# Patient Record
Sex: MALE | Race: WHITE | Employment: OTHER | URBAN - METROPOLITAN AREA
[De-identification: names, ages, dates, MRNs, and addresses within clinical notes are randomized per-mention and may not be internally consistent; named-entity substitution may affect disease eponyms.]

---

## 2022-05-07 ENCOUNTER — HOSPITAL ENCOUNTER (OUTPATIENT)
Age: 42
Discharge: HOME | End: 2022-05-07
Payer: COMMERCIAL

## 2022-05-07 VITALS
TEMPERATURE: 98 F | WEIGHT: 260 LBS | HEART RATE: 86 BPM | OXYGEN SATURATION: 100 % | SYSTOLIC BLOOD PRESSURE: 113 MMHG | BODY MASS INDEX: 35.21 KG/M2 | HEIGHT: 72 IN | RESPIRATION RATE: 18 BRPM | DIASTOLIC BLOOD PRESSURE: 84 MMHG

## 2022-05-07 DIAGNOSIS — Z20.822 ENCOUNTER FOR LABORATORY TESTING FOR COVID-19 VIRUS: ICD-10-CM

## 2022-05-07 DIAGNOSIS — U07.1 COVID-19 VIRUS INFECTION: Primary | ICD-10-CM

## 2022-05-07 LAB — SARS-COV-2 RNA RESP QL NAA+PROBE: DETECTED

## 2022-05-07 NOTE — ED INITIAL ASSESSMENT (HPI)
Pt came in due to headache, congestion, and exposed to covid in the past 2 days. Pt has easy non labored respirations.

## 2024-04-09 ENCOUNTER — HOSPITAL ENCOUNTER (OUTPATIENT)
Age: 44
Discharge: HOME | End: 2024-04-09
Payer: COMMERCIAL

## 2024-04-09 VITALS
OXYGEN SATURATION: 98 % | HEART RATE: 87 BPM | TEMPERATURE: 98 F | RESPIRATION RATE: 18 BRPM | SYSTOLIC BLOOD PRESSURE: 137 MMHG | DIASTOLIC BLOOD PRESSURE: 85 MMHG

## 2024-04-09 DIAGNOSIS — S61.208A AVULSION OF SKIN OF MIDDLE FINGER, INITIAL ENCOUNTER: Primary | ICD-10-CM

## 2024-04-09 PROCEDURE — 99213 OFFICE O/P EST LOW 20 MIN: CPT | Performed by: NURSE PRACTITIONER

## 2024-04-09 RX ORDER — LOSARTAN POTASSIUM 50 MG/1
50 TABLET ORAL DAILY
COMMUNITY
End: 2024-04-15

## 2024-04-09 RX ORDER — PANTOPRAZOLE SODIUM 40 MG/1
40 TABLET, DELAYED RELEASE ORAL DAILY
COMMUNITY
Start: 2024-03-07

## 2024-04-09 RX ORDER — BUPROPION HYDROCHLORIDE 300 MG/1
300 TABLET ORAL DAILY
COMMUNITY

## 2024-04-09 RX ORDER — ROSUVASTATIN CALCIUM 20 MG/1
20 TABLET, COATED ORAL DAILY
COMMUNITY
Start: 2024-01-18

## 2024-04-09 RX ORDER — ATORVASTATIN CALCIUM 20 MG/1
20 TABLET, FILM COATED ORAL DAILY
COMMUNITY
Start: 2023-12-02 | End: 2024-04-15

## 2024-04-09 RX ORDER — ESCITALOPRAM OXALATE 10 MG/1
10 TABLET ORAL DAILY
COMMUNITY

## 2024-04-09 NOTE — ED PROVIDER NOTES
Patient Seen in: [facility]      History   No chief complaint on file.    Stated Complaint: left hand laceration    Subjective:   Well-appearing 43-year-old male with Chiari malformation, anxiety, attention deficit hyperactivity disorder, esophagitis, gastroesophageal reflux hyperlipidemia and essential hypertension presents with a skin avulsion to the tip of his left third digit that occurred about 30 minutes prior to arrival while using a 's knife at home.  Patient denies other injuries.  Patient communicates that his last tetanus was in 2016.  Patient denies extremity numbness or weakness, loss of sensation, decreased range of motion.              Objective:   History reviewed. No pertinent past medical history.           History reviewed. No pertinent surgical history.             No pertinent social history.            Review of Systems    Positive for stated complaint: left hand laceration  Other systems are as noted in HPI.  Constitutional and vital signs reviewed.      All other systems reviewed and negative except as noted above.    Physical Exam     ED Triage Vitals [04/09/24 1734]   /85   Pulse 87   Resp 18   Temp 97.8 °F (36.6 °C)   Temp src Temporal   SpO2 98 %   O2 Device None (Room air)       Current:/85   Pulse 87   Temp 97.8 °F (36.6 °C) (Temporal)   Resp 18   SpO2 98%         Physical Exam  VS: Vital signs reviewed. 02 saturation within normal limits for this patient.    General: Patient is awake and alert, oriented to person, place and time. Pt appears non-toxic.     HEENT: Head is normocephalic, atraumatic.  Nonicteric sclera, no conjunctival injection. No facial droop or slurred speech. No oral lesions or pallor. Mucous membranes moist.     Neck: Supple. Normal ROM.    Lungs: Good inspiratory effort.  No accessory muscle use or tachypnea.    Abdomen: Soft, nontender, non-distended.    Back: Normal inspection, no tenderness.     Extremities: Capillary refill  noted.      Left wrist: Normal.      Left hand: Skin avulsion present to distal left third digit, palmar aspect. Tenderness present. No swelling, deformity or bony tenderness. Normal range of motion. Normal strength. Normal sensation. Normal capillary refill. Normal pulse.     Skin: Warm, dry and normal in color.     Psychiatric: Normal affect, judgement normal, insight normal.     CNS: Moves all 4 extremities. Interacts appropriately. No gait abnormality. Memory normal.        ED Course   Labs Reviewed - No data to display    MDM   Medical Decision Making  Well-appearing.  Patient confirmed that his last tetanus was in April 2018.  Surgicel and a tube gauze dressing was applied in clinic.  Bleeding controlled.  I discussed over-the-counter acetaminophen and/or ibuprofen as needed for pain or discomfort.  Wound care was discussed with patient.  Information for follow-up was provided on discharge papers.  Return precautions discussed.    Differential diagnosis considered included laceration versus skin avulsion versus fingertip amputation.    Problems Addressed:  Avulsion of skin of middle finger, initial encounter: acute illness or injury    Risk  OTC drugs.        Disposition and Plan     Clinical Impression:  1. Avulsion of skin of middle finger, initial encounter         Disposition:  Discharge  4/9/2024  5:55 pm    Follow-up:  E[facility] Kat [facility] [address]  [phone]    In 1 week            Medications Prescribed:  Discharge Medication List as of 4/9/2024  5:56 PM

## 2024-04-15 ENCOUNTER — OFFICE VISIT (OUTPATIENT)
Dept: SURGERY | Facility: CLINIC | Age: 44
End: 2024-04-15

## 2024-04-15 DIAGNOSIS — S61.203A UNSPECIFIED OPEN WOUND OF LEFT MIDDLE FINGER WITHOUT DAMAGE TO NAIL, INITIAL ENCOUNTER: Primary | ICD-10-CM

## 2024-04-15 PROCEDURE — 99204 OFFICE O/P NEW MOD 45 MIN: CPT | Performed by: PLASTIC SURGERY

## 2024-04-15 RX ORDER — TADALAFIL 5 MG/1
5 TABLET ORAL
COMMUNITY
Start: 2018-06-01

## 2024-04-15 RX ORDER — LOSARTAN POTASSIUM 25 MG/1
TABLET ORAL
COMMUNITY
Start: 2024-01-03

## 2024-04-15 RX ORDER — ALBUTEROL SULFATE 90 UG/1
AEROSOL, METERED RESPIRATORY (INHALATION)
COMMUNITY
Start: 2023-11-08

## 2024-04-15 RX ORDER — DEXTROAMPHETAMINE SACCHARATE, AMPHETAMINE ASPARTATE, DEXTROAMPHETAMINE SULFATE AND AMPHETAMINE SULFATE 5; 5; 5; 5 MG/1; MG/1; MG/1; MG/1
20 TABLET ORAL 2 TIMES DAILY
COMMUNITY
Start: 2023-12-12

## 2024-04-15 RX ORDER — ROPINIROLE 0.5 MG/1
0.5 TABLET, FILM COATED ORAL DAILY
COMMUNITY
Start: 2024-03-01

## 2024-04-15 RX ORDER — ROPINIROLE 0.25 MG/1
0.25 TABLET, FILM COATED ORAL NIGHTLY
COMMUNITY
Start: 2024-02-07

## 2024-04-15 NOTE — H&P
Injury 1: LMF laceration  - Date: 04/09/24  - Days Since: 6    E Rip Murphy is a 43 year old male that presents with   Chief Complaint   Patient presents with    Laceration     LMF laceration   .    REFERRED BY:  Kandace Brower    Pacemaker: No  Latex Allergy: no  Coumadin: No  Plavix: No  Other anticoagulants: No  Diet medication: No  Cardiac stents: No    HAND DOMINANCE:  Right    Profession: Realtor    RECONSTRUCTIVE HISTORY    SUN EXPOSURE   Current no   Past no   Sunburns yes   Tanning salons current no   Tanning salons past no     SKIN CANCER    Personal history of skin cancer: none      HPI:       Injury 1: LMF TIP AVULSION  - Date: 04/09/24  - Days Since: 6      43-year-old male right-hand-dominant with an LMF tip amputation    Chopping cilantro    Immediate care, cleansed, Surgicel    Surgicel fell off and he has been washing and Band-Aid            Review of Systems:   Constitutional: No change in appetite, chill/rigors, or fatigue  GI: No jaundice  Endocrine: No generalized weakness  Neurological: No aphasia, loss of consciousness, or seizures    Musculoskeletal:      Date of injury 4/9/24     Location left      middle finger      Mechanism Cut finger while chopping cilantro     Treatment Seen in immediate care on 4/9/24, wound cleaned, Surgicel, gauze bandage, no antibiotics prescribed    Local care: washing daily, new bandaid     Pain  no     Numbness Yes slight at LMF DP          PMH:     MEDICAL  No past medical history on file.     SURGICAL  Past Surgical History:   Procedure Laterality Date    Hernia repair      Removal adenoids,second,<11 y/o          ALLERIGIES  Allergies   Allergen Reactions    Lisinopril Coughing        MEDICATIONS  Current Outpatient Medications   Medication Sig Dispense Refill    losartan 25 MG Oral Tab       rOPINIRole 0.25 MG Oral Tab Take 1 tablet (0.25 mg total) by mouth nightly.      rOPINIRole 0.5 MG Oral Tab Take 1 tablet (0.5 mg total) by mouth daily.       tadalafil 5 MG Oral Tab Take 1 tablet (5 mg total) by mouth.      buPROPion  MG Oral Tablet 24 Hr Take 1 tablet (300 mg total) by mouth daily.      escitalopram 10 MG Oral Tab Take 1 tablet (10 mg total) by mouth daily.      pantoprazole 40 MG Oral Tab EC Take 1 tablet (40 mg total) by mouth daily.      rosuvastatin 20 MG Oral Tab Take 1 tablet (20 mg total) by mouth daily.      albuterol 108 (90 Base) MCG/ACT Inhalation Aero Soln INHALE 1 - 2 PUFFS INHALED EVERY 4 TO 6 HOURS AS NEEDED FOR SHORTNESS OF BREATH OR WHEEZING (Patient not taking: Reported on 4/15/2024)      amphetamine-dextroamphetamine 20 MG Oral Tab Take 1 tablet (20 mg total) by mouth 2 (two) times daily. (Patient not taking: Reported on 4/15/2024)          SOCIAL HISTORY  Social History     Socioeconomic History    Marital status:         FAMILY HISTORY  No family history on file.       PHYSICAL EXAM:     CONSTITUTIONAL: Overall appearance - Normal  HEENT: Normocephalic  EYES: Conjunctiva - Right: Normal, Left: Normal; EOMI  EARS: Inspection - Right: Normal, Left: Normal  NECK/THYROID: Inspection - Normal, Palpation - Normal, Thyroid gland - Normal, No adenopathy  RESPIRATORY: Inspection - Normal, Effort - Normal  CARDIOVASCULAR: Regular rhythm, No murmurs  ABDOMEN: Inspection - Normal, No abdominal tenderness  NEURO: Memory intact  PSYCH: Oriented to person, place, time, and situation, Appropriate mood and affect      Hand Physical Exam:     LMF pulp 10 x 3 crusted superficial wound without drainage or infection        ASSESSMENT/PLAN:       SOFT TISSUE AVULSION LMF    We had a long discussion.  This is a small enough wound, that it should epithelialize with local care.  If it does not, we would consider surgical intervention.    Wash daily  Polysporin  Band-Aid    When this is healed, start Eucerin moisturizers and massage  \"After Skin Surgery\" pamphlet dispensed.    No surgery is indicated for this wound      4/15/2024  Eebr BARAKAT  MD Nate        +++++++++++++++++++++++++++++++++++++++++      MEDICAL DECISION MAKING    PROBLEMS      MODERATE    (number / complexity)          Acute complicated injury    DATA         STRAIGHTFORWARD    (amount / complexity)              MANAGEMENT RISK  MODERATE    (complications/ morbidity)       Decision regarding major surgery                  [facility] LEVEL    MODERATE

## 2024-04-15 NOTE — PROGRESS NOTES
Per Dr Sullivan evaluation, pt instructed to wash LMF daily w/ soap and water, apply Polysporin, bandaid until fully healed.  Patient instructed to begin Eucerin massages three times a day for 1 minute after LMF wound completely healed.  Eucerin massage demonstrated to pt and \"After Skin Surgery\" pamphlet given.  Patient instructed on importance of sun block use for the first year after surgery.  Patient verbalized an understanding of teaching.  Patient instructed to call the office with any further questions and/or concerns.

## 2025-04-14 ENCOUNTER — HOSPITAL ENCOUNTER (OUTPATIENT)
Dept: CT IMAGING | Facility: HOSPITAL | Age: 45
Discharge: HOME | End: 2025-04-14
Attending: PHYSICIAN ASSISTANT

## 2025-04-14 DIAGNOSIS — Z13.6 SCREENING FOR CARDIOVASCULAR CONDITION: ICD-10-CM
